# Patient Record
Sex: FEMALE | Race: WHITE | NOT HISPANIC OR LATINO | URBAN - METROPOLITAN AREA
[De-identification: names, ages, dates, MRNs, and addresses within clinical notes are randomized per-mention and may not be internally consistent; named-entity substitution may affect disease eponyms.]

---

## 2021-05-17 ENCOUNTER — OFFICE VISIT (OUTPATIENT)
Dept: URBAN - METROPOLITAN AREA CLINIC 56 | Facility: CLINIC | Age: 81
End: 2021-05-17
Payer: MEDICARE

## 2021-05-17 DIAGNOSIS — H35.3131 BILATERAL NONEXUDATIVE AGE-RELATED MACULAR DEGENERATION, EARLY DRY STAGE: Primary | ICD-10-CM

## 2021-05-17 DIAGNOSIS — H52.4 PRESBYOPIA: ICD-10-CM

## 2021-05-17 DIAGNOSIS — H26.492 OTHER SECONDARY CATARACT, LEFT EYE: ICD-10-CM

## 2021-05-17 DIAGNOSIS — H35.371 PUCKERING OF MACULA, RIGHT EYE: ICD-10-CM

## 2021-05-17 DIAGNOSIS — H26.491 OTHER SECONDARY CATARACT, RIGHT EYE: ICD-10-CM

## 2021-05-17 DIAGNOSIS — H43.812 VITREOUS DEGENERATION, LEFT EYE: ICD-10-CM

## 2021-05-17 PROCEDURE — 92134 CPTRZ OPH DX IMG PST SGM RTA: CPT | Performed by: OPTOMETRIST

## 2021-05-17 PROCEDURE — 99204 OFFICE O/P NEW MOD 45 MIN: CPT | Performed by: OPTOMETRIST

## 2021-05-17 ASSESSMENT — VISUAL ACUITY
OD: 20/40
OS: 20/50

## 2021-05-17 ASSESSMENT — KERATOMETRY
OD: 47.60
OS: 47.74

## 2021-05-17 ASSESSMENT — INTRAOCULAR PRESSURE
OD: 16
OS: 18

## 2021-05-17 NOTE — IMPRESSION/PLAN
Impression: Vitreous degeneration, left eye: H43.812. Plan:  The macular morphology in the Left eye is normal.  All signs and risks of retinal detachment and tears were discussed in detail. Patient instructed to call the office immediately if any symptoms noted.

## 2021-05-17 NOTE — IMPRESSION/PLAN
Impression: Puckering of macula, right eye: H35.371. Plan: Explained to the patient the mild wrinkling observed in her Right eye is contributing slightly to her reduced acuity but the condition does not require any treatment other than monitoring with an annual comprehensive exam and same day OCT macular scan. Educated patient on findings and diagnosis. OCT Macular Scan completed, reviewed and documented. Will continue to monitor.

## 2021-05-17 NOTE — IMPRESSION/PLAN
Impression: Other secondary cataract, right eye: H26.491. Plan: Discussed procedure. Risks, benefits, alternatives reviewed with patient. Patient elects to proceed with YAG. Refer for YAG capsulotomy consultation.

## 2021-05-17 NOTE — IMPRESSION/PLAN
Impression: Other secondary cataract, left eye: H26.492. Plan: Discussed procedure. Risks, benefits, alternatives reviewed with patient. Patient elects to proceed with YAG. Refer for YAG capsulotomy consultation.

## 2021-06-04 ENCOUNTER — ADULT PHYSICAL (OUTPATIENT)
Dept: URBAN - METROPOLITAN AREA CLINIC 56 | Facility: CLINIC | Age: 81
End: 2021-06-04
Payer: MEDICARE

## 2021-06-04 DIAGNOSIS — Z01.818 ENCOUNTER FOR OTHER PREPROCEDURAL EXAMINATION: Primary | ICD-10-CM

## 2021-06-04 DIAGNOSIS — H26.493 OTHER SECONDARY CATARACT, BILATERAL: ICD-10-CM

## 2021-06-04 PROCEDURE — 99203 OFFICE O/P NEW LOW 30 MIN: CPT | Performed by: PHYSICIAN ASSISTANT

## 2021-06-15 ENCOUNTER — SURGERY (OUTPATIENT)
Dept: URBAN - METROPOLITAN AREA SURGERY 19 | Facility: SURGERY | Age: 81
End: 2021-06-15
Payer: MEDICARE

## 2021-06-15 PROCEDURE — 66821 AFTER CATARACT LASER SURGERY: CPT | Performed by: OPHTHALMOLOGY

## 2021-06-23 ENCOUNTER — SURGERY (OUTPATIENT)
Dept: URBAN - METROPOLITAN AREA SURGERY 19 | Facility: SURGERY | Age: 81
End: 2021-06-23
Payer: MEDICARE

## 2021-06-23 PROCEDURE — 66821 AFTER CATARACT LASER SURGERY: CPT | Performed by: OPHTHALMOLOGY

## 2021-07-08 ENCOUNTER — POST-OPERATIVE VISIT (OUTPATIENT)
Dept: URBAN - METROPOLITAN AREA CLINIC 56 | Facility: CLINIC | Age: 81
End: 2021-07-08

## 2021-07-08 DIAGNOSIS — Z96.1 PRESENCE OF INTRAOCULAR LENS: Primary | ICD-10-CM

## 2021-07-08 PROCEDURE — 99024 POSTOP FOLLOW-UP VISIT: CPT | Performed by: OPTOMETRIST

## 2021-07-08 ASSESSMENT — VISUAL ACUITY
OD: 20/40++
OS: 20/40

## 2021-07-08 ASSESSMENT — INTRAOCULAR PRESSURE
OD: 15
OS: 32

## 2021-07-08 NOTE — IMPRESSION/PLAN
Impression: S/P YAG Capsulotomy (Yttrium Aluminum [place]) OD - 15 Days. Presence of intraocular lens  Z96.1. Plan: S/P Yag OU Patient reports improvement AMD/ERM explains acuities RTC 1yr CE/ OCT MAC

## 2022-07-07 ENCOUNTER — OFFICE VISIT (OUTPATIENT)
Dept: URBAN - METROPOLITAN AREA CLINIC 56 | Facility: CLINIC | Age: 82
End: 2022-07-07
Payer: MEDICARE

## 2022-07-07 DIAGNOSIS — H40.021 OPEN ANGLE WITH BORDERLINE FINDINGS, HIGH RISK, RIGHT EYE: ICD-10-CM

## 2022-07-07 DIAGNOSIS — H40.1122 PRIMARY OPEN-ANGLE GLAUCOMA, MODERATE STAGE, LEFT EYE: Primary | ICD-10-CM

## 2022-07-07 DIAGNOSIS — H43.812 VITREOUS DEGENERATION, LEFT EYE: ICD-10-CM

## 2022-07-07 DIAGNOSIS — H35.371 PUCKERING OF MACULA, RIGHT EYE: ICD-10-CM

## 2022-07-07 DIAGNOSIS — H35.3132 BILATERAL NONEXUDATIVE AGE-RELATED MACULAR DEGENERATION, INTERMEDIATE DRY STAGE: ICD-10-CM

## 2022-07-07 DIAGNOSIS — Z96.1 PRESENCE OF PSEUDOPHAKIA: ICD-10-CM

## 2022-07-07 PROCEDURE — 99214 OFFICE O/P EST MOD 30 MIN: CPT | Performed by: STUDENT IN AN ORGANIZED HEALTH CARE EDUCATION/TRAINING PROGRAM

## 2022-07-07 PROCEDURE — 92134 CPTRZ OPH DX IMG PST SGM RTA: CPT | Performed by: STUDENT IN AN ORGANIZED HEALTH CARE EDUCATION/TRAINING PROGRAM

## 2022-07-07 PROCEDURE — 92020 GONIOSCOPY: CPT | Performed by: STUDENT IN AN ORGANIZED HEALTH CARE EDUCATION/TRAINING PROGRAM

## 2022-07-07 PROCEDURE — 92133 CPTRZD OPH DX IMG PST SGM ON: CPT | Performed by: STUDENT IN AN ORGANIZED HEALTH CARE EDUCATION/TRAINING PROGRAM

## 2022-07-07 RX ORDER — LATANOPROST 50 UG/ML
0.005 % SOLUTION OPHTHALMIC
Qty: 7.5 | Refills: 2 | Status: ACTIVE
Start: 2022-07-07

## 2022-07-07 RX ORDER — DORZOLAMIDE HYDROCHLORIDE AND TIMOLOL MALEATE 20; 5 MG/ML; MG/ML
SOLUTION/ DROPS OPHTHALMIC
Qty: 30 | Refills: 3 | Status: ACTIVE
Start: 2022-07-07

## 2022-07-07 ASSESSMENT — INTRAOCULAR PRESSURE
OD: 23
OS: 48

## 2022-07-07 ASSESSMENT — VISUAL ACUITY
OS: 20/30
OD: 20/40

## 2022-07-07 ASSESSMENT — KERATOMETRY
OS: 47.51
OD: 47.18

## 2022-07-07 NOTE — IMPRESSION/PLAN
Impression: Primary open-angle glaucoma, moderate stage, left eye: H40.1122. TP: OS low 20s (50% reduction) TMAX: 23 / 48 Gonio (07/2022): CBB OU, 4+ TM pig inf OS LRNFL (07/2022): full OD, temp thinning OS Pachs: 531 / 512 Plan: very elevated IOP OS, borderline OD. RNFL with thinning OS only, full OD. Gonio open OU but heavy TM pigment OS with few TIDs. Lengthy discussion regarding findings with patient - recommend lowering IOP stat OU to prevent further progression. Poor compliance with treatment can lead to permanent vision loss. Start Latanoprost QHS OU Start Dorz-Juan BID OS only

return in 7-10 days IOP and HVF 24-2

## 2022-07-07 NOTE — IMPRESSION/PLAN
Impression: Bilateral nonexudative age-related macular degeneration, intermediate dry stage: H35.3132. Plan: no ME on mac OCT. Continue AREDS 2 PO.  Monitor with Amsler - call with vision changes

## 2022-07-07 NOTE — IMPRESSION/PLAN
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 Plan: see plan Q75.6772.

## 2022-07-14 ENCOUNTER — OFFICE VISIT (OUTPATIENT)
Dept: URBAN - METROPOLITAN AREA CLINIC 56 | Facility: CLINIC | Age: 82
End: 2022-07-14
Payer: MEDICARE

## 2022-07-14 DIAGNOSIS — H40.021 OPEN ANGLE WITH BORDERLINE FINDINGS, HIGH RISK, RIGHT EYE: ICD-10-CM

## 2022-07-14 DIAGNOSIS — H40.1122 PRIMARY OPEN-ANGLE GLAUCOMA, LEFT EYE, MODERATE STAGE: Primary | ICD-10-CM

## 2022-07-14 PROCEDURE — 92083 EXTENDED VISUAL FIELD XM: CPT | Performed by: STUDENT IN AN ORGANIZED HEALTH CARE EDUCATION/TRAINING PROGRAM

## 2022-07-14 PROCEDURE — 99213 OFFICE O/P EST LOW 20 MIN: CPT | Performed by: STUDENT IN AN ORGANIZED HEALTH CARE EDUCATION/TRAINING PROGRAM

## 2022-07-14 ASSESSMENT — INTRAOCULAR PRESSURE
OD: 15
OS: 18

## 2022-07-14 NOTE — IMPRESSION/PLAN
Impression: Primary open-angle glaucoma, moderate stage, left eye: H40.1122. TP: OS low 20s (50% reduction) TMAX: 23 / 48 Gonio (07/2022): CBB OU, 4+ TM pig inf OS LRNFL (07/2022): full OD, temp thinning OS
LHVF (07/2022): full OD, dense arc sup/inf OS Pachs: 531 / 512 Plan: excellent IOP response OS. HVF full OD, corresponding dense arcuates OS. Discussed with patient. No changes made to treatment. Strict compliance or risk of permanent vision loss. Cont Latanoprost QHS OU Cont Dorz-Juan BID OS only RTC for IOP check 3 mos

## 2022-07-14 NOTE — IMPRESSION/PLAN
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 Plan: see plan A12.2633.

## 2022-10-13 ENCOUNTER — OFFICE VISIT (OUTPATIENT)
Dept: URBAN - METROPOLITAN AREA CLINIC 56 | Facility: CLINIC | Age: 82
End: 2022-10-13
Payer: MEDICARE

## 2022-10-13 DIAGNOSIS — H40.1122 PRIMARY OPEN-ANGLE GLAUCOMA, MODERATE STAGE, LEFT EYE: Primary | ICD-10-CM

## 2022-10-13 DIAGNOSIS — H40.021 OPEN ANGLE WITH BORDERLINE FINDINGS, HIGH RISK, RIGHT EYE: ICD-10-CM

## 2022-10-13 PROCEDURE — 99214 OFFICE O/P EST MOD 30 MIN: CPT | Performed by: STUDENT IN AN ORGANIZED HEALTH CARE EDUCATION/TRAINING PROGRAM

## 2022-10-13 ASSESSMENT — INTRAOCULAR PRESSURE
OD: 16
OS: 28

## 2022-10-13 NOTE — IMPRESSION/PLAN
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 Plan: see plan G33.7739.

## 2022-10-13 NOTE — IMPRESSION/PLAN
Impression: Primary open-angle glaucoma, moderate stage, left eye: H40.1122. No timolol - difficulty breathing TP: OS low 20s (50% reduction) TMAX: 23 / 48 Gonio (07/2022): CBB OU, 4+ TM pig inf OS LRNFL (07/2022): full OD, temp thinning OS
LHVF (07/2022): full OD, dense arc sup/inf OS Pachs: 531 / 512 Plan: elevated IOP OS. Recommend SLT vs OMNI vs combo - refer to glc for consult. Continue strict compliance or risk of progression Cont Latanoprost QHS OU Cont Dorzolomide BID OS Cont brimonidine BID OS Start Rhopressa QHS OS only (sample was given) RTC for consult with Dr. Randi Triana 3-4 weeks - discussed OMNI

## 2022-11-01 ENCOUNTER — OFFICE VISIT (OUTPATIENT)
Facility: LOCATION | Age: 82
End: 2022-11-01
Payer: MEDICARE

## 2022-11-01 DIAGNOSIS — H43.813 VITREOUS DEGENERATION, BILATERAL: ICD-10-CM

## 2022-11-01 DIAGNOSIS — H40.1123 PRIMARY OPEN-ANGLE GLAUCOMA, SEVERE STAGE, LEFT EYE: Primary | ICD-10-CM

## 2022-11-01 DIAGNOSIS — H35.3132 BILATERAL NONEXUDATIVE AGE-RELATED MACULAR DEGENERATION, INTERMEDIATE DRY STAGE: ICD-10-CM

## 2022-11-01 DIAGNOSIS — H40.021 OPEN ANGLE WITH BORDERLINE FINDINGS, HIGH RISK, RIGHT EYE: ICD-10-CM

## 2022-11-01 DIAGNOSIS — Z96.1 PRESENCE OF PSEUDOPHAKIA: ICD-10-CM

## 2022-11-01 PROCEDURE — 99214 OFFICE O/P EST MOD 30 MIN: CPT | Performed by: STUDENT IN AN ORGANIZED HEALTH CARE EDUCATION/TRAINING PROGRAM

## 2022-11-01 PROCEDURE — 92020 GONIOSCOPY: CPT | Performed by: STUDENT IN AN ORGANIZED HEALTH CARE EDUCATION/TRAINING PROGRAM

## 2022-11-01 RX ORDER — PREDNISOLONE ACETATE 10 MG/ML
1 % SUSPENSION/ DROPS OPHTHALMIC
Qty: 10 | Refills: 0 | Status: ACTIVE
Start: 2022-11-01

## 2022-11-01 RX ORDER — BRIMONIDINE TARTRATE 2 MG/ML
0.2 % SOLUTION/ DROPS OPHTHALMIC
Qty: 10 | Refills: 3 | Status: ACTIVE
Start: 2022-11-01

## 2022-11-01 ASSESSMENT — INTRAOCULAR PRESSURE
OS: 24
OD: 18

## 2022-11-01 NOTE — IMPRESSION/PLAN
Impression: Primary open-angle glaucoma, severe stage, left eye: H40.1123. Plan: Pachy: 127/300 Tmax: 23/48 Target IOP: low to mid teens OU Med Allergies: avoid Juan(difficult breathing) Heart / Lung / Kidney disease/sulfa allergy: Pt. denies Gonioscopy: open to tmp 360 2+ tmp OU
OCT: (7/22) 97 / 56 diffuse thinning HVF: OD: MD -2 early sup arc/ OS: MD -15 dense S> I arc
C/D: 0.3/0.8 Better seeing eye:  OD
IOP Today: 18/24 Plan: IOP today is okay OD, but still elevated OS. Previous testing reviewed. VF shows significant vision loss OS from glaucoma. IOP is too high for amount of glaucoma present. Discussed the need for SLT vs additional medicine to try and achieve better pressure control. Selective Laser Trabeculoplasty risks, benefits, alternatives to laser discussed with patient, including inflammation, IOP spike, light sensitivity. Discussed it may take anywhere from 6wks to 3mos to see the full effect of the laser. Patient understands that SLT is not a replacement for current drop therapy. All questions answered. Patient understands and desires to proceed. See Landy Kaur to schedule SLT OS. Will need appointment 6wk for IOP check. Patient to CONTINUE glaucoma gtts. Erx'd post op drop of PF TID x5days. Drops:  Continue: Latanoprost QHS OU, Dorzolamide BID OS, Brimonidine BID OS, Rhopressa QHS OS Discussed natural history of glaucoma and that irreversible vision loss can occur without adequate IOP control. Emphasized compliance with eyedrops and other treatment described above.

## 2022-11-01 NOTE — IMPRESSION/PLAN
Impression: Bilateral nonexudative age-related macular degeneration, intermediate dry stage: H35.3132. Plan: AREDS and a diet heavier in green leafy vegetables discussed. Signs and symptoms of WET macular degeneration were discussed (wavy vision, blurred vision). Patient instructed to call or return to clinic if condition gets worse.

## 2022-11-01 NOTE — IMPRESSION/PLAN
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 Plan: see plan Y34.4181.

## 2022-11-30 ENCOUNTER — SURGERY (OUTPATIENT)
Dept: URBAN - METROPOLITAN AREA SURGERY 28 | Facility: LOCATION | Age: 82
End: 2022-11-30
Payer: MEDICARE

## 2022-11-30 PROCEDURE — 65855 TRABECULOPLASTY LASER SURG: CPT | Performed by: STUDENT IN AN ORGANIZED HEALTH CARE EDUCATION/TRAINING PROGRAM

## 2022-12-27 ENCOUNTER — OFFICE VISIT (OUTPATIENT)
Facility: LOCATION | Age: 82
End: 2022-12-27
Payer: MEDICARE

## 2022-12-27 DIAGNOSIS — H40.1123 PRIMARY OPEN-ANGLE GLAUCOMA, SEVERE STAGE, LEFT EYE: Primary | ICD-10-CM

## 2022-12-27 DIAGNOSIS — H40.021 OPEN ANGLE WITH BORDERLINE FINDINGS, HIGH RISK, RIGHT EYE: ICD-10-CM

## 2022-12-27 PROCEDURE — 99213 OFFICE O/P EST LOW 20 MIN: CPT | Performed by: STUDENT IN AN ORGANIZED HEALTH CARE EDUCATION/TRAINING PROGRAM

## 2022-12-27 ASSESSMENT — INTRAOCULAR PRESSURE
OS: 32
OD: 26

## 2022-12-27 NOTE — IMPRESSION/PLAN
Impression: Primary open-angle glaucoma, severe stage, left eye: H40.1123. Plan: Pachy: 474/712 Tmax: 23/48 Target IOP: low to mid teens OU Med Allergies: avoid Juan(difficult breathing) Heart / Lung / Kidney disease/sulfa allergy: Pt. denies Gonioscopy: open to tmp 360 2+ tmp OU
OCT: (7/22) 97 / 56 diffuse thinning HVF: OD: MD -2 early sup arc/ OS: MD -15 dense S> I arc
C/D: 0.3/0.8 Better seeing eye:  OD
IOP Today: 26, 32 Plan: IOP today is elevated OU. Patient states that she has not used any drops. Recommend to RESTART all medications. Discussed future potential surgical options but patient would like to hold off at this time. Drops: RESTART:  Latanoprost QHS OU, Dorzolamide BID OS, Brimonidine BID OS, Rhopressa QHS OS Discussed natural history of glaucoma and that irreversible vision loss can occur without adequate IOP control. Emphasized compliance with eyedrops and other treatment described above.

## 2022-12-27 NOTE — IMPRESSION/PLAN
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 Plan: see plan U24.9091.

## 2023-01-24 ENCOUNTER — OFFICE VISIT (OUTPATIENT)
Facility: LOCATION | Age: 83
End: 2023-01-24
Payer: MEDICARE

## 2023-01-24 DIAGNOSIS — H40.021 OPEN ANGLE WITH BORDERLINE FINDINGS, HIGH RISK, RIGHT EYE: ICD-10-CM

## 2023-01-24 DIAGNOSIS — H40.1123 PRIMARY OPEN-ANGLE GLAUCOMA, SEVERE STAGE, LEFT EYE: Primary | ICD-10-CM

## 2023-01-24 PROCEDURE — 99213 OFFICE O/P EST LOW 20 MIN: CPT | Performed by: STUDENT IN AN ORGANIZED HEALTH CARE EDUCATION/TRAINING PROGRAM

## 2023-01-24 ASSESSMENT — INTRAOCULAR PRESSURE
OD: 18
OS: 20

## 2023-01-24 NOTE — IMPRESSION/PLAN
Impression: Primary open-angle glaucoma, severe stage, left eye: H40.1123. Plan: Pachy: 739/352 Tmax: 23/48 Target IOP: <20 OD, low teens OS Med Allergies: avoid Juan(difficult breathing) Heart / Lung / Kidney disease/sulfa allergy: Pt. denies Gonioscopy: open to tmp 360 2+ tmp OU
OCT: (7/22) 97 / 56 diffuse thinning HVF: OD: MD -2 early sup arc/ OS: MD -15 dense S> I arc
C/D: 0.3/0.8 Better seeing eye:  OD
IOP Today: 18/20 Plan: IOP today is lower OU sine restarting gtts. IOP at goal OD, OS needs to be lower. Discussed surgical inventions to help lower IOP OS with patient. Patient wishes to wait 1 month and reevaluate. Will continue to monitor IOP. Drops: CONTINUE:  Latanoprost QHS OU, Dorzolamide BID OS, Brimonidine BID OS, Rhopressa QHS OS(sample given in clinic today) Discussed natural history of glaucoma and that irreversible vision loss can occur without adequate IOP control. Emphasized compliance with eyedrops and other treatment described above.

## 2023-01-24 NOTE — IMPRESSION/PLAN
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 Plan: see plan M90.2212.

## 2023-02-21 ENCOUNTER — OFFICE VISIT (OUTPATIENT)
Facility: LOCATION | Age: 83
End: 2023-02-21
Payer: MEDICARE

## 2023-02-21 DIAGNOSIS — H40.021 OPEN ANGLE WITH BORDERLINE FINDINGS, HIGH RISK, RIGHT EYE: ICD-10-CM

## 2023-02-21 DIAGNOSIS — H40.1123 PRIMARY OPEN-ANGLE GLAUCOMA, SEVERE STAGE, LEFT EYE: Primary | ICD-10-CM

## 2023-02-21 DIAGNOSIS — H35.371 PUCKERING OF MACULA, RIGHT EYE: ICD-10-CM

## 2023-02-21 PROCEDURE — 99214 OFFICE O/P EST MOD 30 MIN: CPT | Performed by: STUDENT IN AN ORGANIZED HEALTH CARE EDUCATION/TRAINING PROGRAM

## 2023-02-21 RX ORDER — BRIMONIDINE TARTRATE 2 MG/ML
0.2 % SOLUTION/ DROPS OPHTHALMIC
Qty: 10 | Refills: 5 | Status: ACTIVE
Start: 2023-02-21

## 2023-02-21 RX ORDER — DORZOLAMIDE HCL 20 MG/ML
2 % SOLUTION/ DROPS OPHTHALMIC
Qty: 10 | Refills: 5 | Status: ACTIVE
Start: 2023-02-21

## 2023-02-21 RX ORDER — NETARSUDIL 0.2 MG/ML
0.02 % SOLUTION/ DROPS OPHTHALMIC; TOPICAL
Qty: 2.5 | Refills: 5 | Status: ACTIVE
Start: 2023-02-21

## 2023-02-21 RX ORDER — LATANOPROST 50 UG/ML
0.005 % SOLUTION OPHTHALMIC
Qty: 7.5 | Refills: 2 | Status: ACTIVE
Start: 2023-02-21

## 2023-02-21 RX ORDER — OFLOXACIN 3 MG/ML
0.3 % SOLUTION/ DROPS OPHTHALMIC
Qty: 5 | Refills: 0 | Status: ACTIVE
Start: 2023-02-21

## 2023-02-21 RX ORDER — PREDNISOLONE ACETATE 10 MG/ML
1 % SUSPENSION/ DROPS OPHTHALMIC
Qty: 10 | Refills: 3 | Status: ACTIVE
Start: 2023-02-21

## 2023-02-21 ASSESSMENT — INTRAOCULAR PRESSURE
OS: 19
OD: 18

## 2023-02-21 NOTE — IMPRESSION/PLAN
Impression: Primary open-angle glaucoma, severe stage, left eye: H40.1123.
2 Plan: Xen gel stent is recommended. Glaucoma diagnosis and progressive vision loss from elevated IOP discussed at length. R/B/A of surgery discussed. The Patient is aware that the risks of Xen include but are not limited to loss of vision and loss of the eye, bleeding, infection, inflammation, Hypotony, wound leak, Persistent IOP elevation, intractable diplopia, bleb failure, scarring, and orbital or ocular inflammation. This surgery is an attempt to preserve vision, vision improvement is not expected. The patient is aware that failure to lower IOP will likely lead to progressive sight loss and possibly blindness. Pt. understand and wishes to proceed. * Please notify OR to order device (Xen gel stent) and Mitomycin 4% (allow 1 week for mitomycin to be ordered). No injectable/Dexycu, use drops. ERx'd Ofloxacin QID OS and Prednisolone Q2H OS as requested.

## 2023-02-21 NOTE — IMPRESSION/PLAN
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 Plan: see plan R98.0801.

## 2023-02-21 NOTE — IMPRESSION/PLAN
Impression: Puckering of macula, right eye: H35.371. Plan: ERM with retinal thickening OD present on previous OCT and today's exam. Recommend patient to get consult with retina.

## 2023-02-21 NOTE — IMPRESSION/PLAN
Impression: Primary open-angle glaucoma, severe stage, left eye: H40.1123.
1 Plan: Ocular Surgical History: IOL OU, YAG OU, SLT OS Pachy: 531/512 Tmax: 23/48 Target IOP: <20 OD, low teens OS Med Allergies: avoid Juan(difficult breathing) Heart / Lung / Kidney disease/sulfa allergy: Pt. denies Gonioscopy: open to tmp 360 2+ tmp OU
OCT: (7/22) 97 / 56 diffuse thinning HVF: OD: MD -2 early sup arc/ OS: MD -15 dense S> I arc
C/D: 0.3/0.8 Better seeing eye: OD
IOP Today: 18/19 Plan: IOP today is unchanged. Recommend Xen OS to lower IOP. Drops: STOP Latanoprost OD, CHANGE Rhopressa QHS from OS to OU, CONTINUE Latanoprost QHS OS, Dorzolamide BID OS, and Brimonidine BID OS. Discussed natural history of glaucoma and that irreversible vision loss can occur without adequate IOP control. Emphasized compliance with eyedrops and other treatment described above.

## 2023-03-24 ENCOUNTER — POST-OPERATIVE VISIT (OUTPATIENT)
Facility: LOCATION | Age: 83
End: 2023-03-24
Payer: MEDICARE

## 2023-03-24 DIAGNOSIS — Z48.810 ENCOUNTER FOR SURGICAL AFTERCARE FOLLOWING SURGERY ON A SENSE ORGAN: Primary | ICD-10-CM

## 2023-03-24 PROCEDURE — 99024 POSTOP FOLLOW-UP VISIT: CPT

## 2023-03-24 ASSESSMENT — INTRAOCULAR PRESSURE
OD: 12
OS: 6
OS: 5

## 2023-03-24 NOTE — IMPRESSION/PLAN
Impression: S/P Shunt:  Xen OS - 1 Day. Encounter for surgical aftercare following surgery on a sense organ  Z48.810. Excellent post op course   Post operative instructions reviewed - Plan: Patient presents for one day post op. Patient advised they are healing well from surgery. Patient advised to refrain from heavy lifting, dirty or getachew environments, water or sweat in the eye and to avoid bending past the hip. Patient advised to wear eye shield at night and refrain from rubbing the eye. Patient was also advised to keep appointment in one week for follow up. Patient to call office with any increased pain or decreased vision.  --Taper Prednisolone acetate 1% as directed started at 6x a day x 2 weeks--Continue Ocuflox QID

--pt to d/c all glaucoma drops OS

## 2023-03-27 ENCOUNTER — POST-OPERATIVE VISIT (OUTPATIENT)
Facility: LOCATION | Age: 83
End: 2023-03-27
Payer: MEDICARE

## 2023-03-27 DIAGNOSIS — Z48.810 ENCOUNTER FOR SURGICAL AFTERCARE FOLLOWING SURGERY ON A SENSE ORGAN: Primary | ICD-10-CM

## 2023-03-27 PROCEDURE — 99024 POSTOP FOLLOW-UP VISIT: CPT

## 2023-03-27 RX ORDER — ATROPINE SULFATE 10 MG/ML
1 % SOLUTION/ DROPS OPHTHALMIC
Qty: 5 | Refills: 0 | Status: INACTIVE
Start: 2023-03-27 | End: 2023-03-29

## 2023-03-27 ASSESSMENT — INTRAOCULAR PRESSURE
OD: 16
OS: 72

## 2023-03-27 NOTE — IMPRESSION/PLAN
Impression: S/P Shunt:  Xen OS - 4 Days. Encounter for surgical aftercare following surgery on a sense organ  Z48.810.

--IOP 72, iris touching cornea
--gave pt two tablets of diamox in office
--called pt after talking to Dr. Janice Street. Atropine sent to pharmacy, pt instructed to take 2 drops tonight and one drop tomorrow Plan: Pt educated on condition. Had  contact Dr. Aalna Staley team, Pt will be seen ASAP (7:55 tomorrow). Dr. Janice Street in sx today.

## 2023-03-28 ENCOUNTER — POST-OPERATIVE VISIT (OUTPATIENT)
Dept: URBAN - METROPOLITAN AREA CLINIC 33 | Facility: CLINIC | Age: 83
End: 2023-03-28
Payer: MEDICARE

## 2023-03-28 PROCEDURE — 99024 POSTOP FOLLOW-UP VISIT: CPT | Performed by: STUDENT IN AN ORGANIZED HEALTH CARE EDUCATION/TRAINING PROGRAM

## 2023-03-28 ASSESSMENT — INTRAOCULAR PRESSURE
OD: 15
OS: 37

## 2023-03-28 NOTE — IMPRESSION/PLAN
Impression: Encounter for surgical aftercare following surgery on a sense organ: Z48.810 OS. Plan: Ocular Surgical History: IOL OU, YAG OU, SLT OS Pachy: 531/512 Tmax: 23/48 Target IOP: <20 OD, low teens OS Med Allergies: avoid Juan(difficult breathing) Heart / Lung / Kidney disease/sulfa allergy: Pt. denies Gonioscopy: open to tmp 360 2+ tmp OU
OCT: (7/22) 97 / 56 diffuse thinning HVF: OD: MD -2 early sup arc/ OS: MD -15 dense S> I arc
C/D: 0.3/0.8 Better seeing eye: OD
IOP Today: 15/37 Plan: IOP is elevated since Xen OS. Aqueous misdirection present and confirmed with bscan today. Drops: Acetazolamide 500mg BID (today only), Atropine BID OS, Ofloxacin QID OS, Prednisolone 6x's daily OS, Rhopressa QHS OU, Dorzolamide BID OS, and Brimonidine BID OS. Drop sheet given and explained to patient. Activity restrictions in place including;  bending below the waist, lifting anything 10lbs or heavier, swimming, rubbing the eye until further notice.

## 2023-03-30 ENCOUNTER — POST-OPERATIVE VISIT (OUTPATIENT)
Facility: LOCATION | Age: 83
End: 2023-03-30
Payer: MEDICARE

## 2023-03-30 PROCEDURE — 99024 POSTOP FOLLOW-UP VISIT: CPT

## 2023-03-30 ASSESSMENT — INTRAOCULAR PRESSURE
OD: 18
OS: 15

## 2023-03-30 NOTE — IMPRESSION/PLAN
Impression: S/P PPV iridozonulectomy OS - 1 Day. Encounter for surgical aftercare following surgery on a sense organ  Z48.810. Excellent post op course   Post operative instructions reviewed - Plan: Patient presents for one day post op. Patient advised they are healing well from surgery. Patient advised to refrain from heavy lifting, dirty or getachew environments, water or sweat in the eye and to avoid bending past the hip. Patient advised to wear eye shield at night and refrain from rubbing the eye. Patient was also advised to keep Scheduled appointment with Dr. Tari Jackson. Patient to call office with any increased pain or decreased vision.  --Continue Ofloxacin 0.3% QID
--Taper Prednisolone acetate 1% 6x a day

## 2023-04-04 ENCOUNTER — POST-OPERATIVE VISIT (OUTPATIENT)
Facility: LOCATION | Age: 83
End: 2023-04-04
Payer: MEDICARE

## 2023-04-04 DIAGNOSIS — Z48.810 ENCOUNTER FOR SURGICAL AFTERCARE FOLLOWING SURGERY ON A SENSE ORGAN: Primary | ICD-10-CM

## 2023-04-04 PROCEDURE — 99024 POSTOP FOLLOW-UP VISIT: CPT | Performed by: STUDENT IN AN ORGANIZED HEALTH CARE EDUCATION/TRAINING PROGRAM

## 2023-04-04 RX ORDER — BRIMONIDINE TARTRATE 2 MG/ML
0.2 % SOLUTION/ DROPS OPHTHALMIC
Qty: 10 | Refills: 5 | Status: ACTIVE
Start: 2023-04-04

## 2023-04-04 RX ORDER — DORZOLAMIDE HCL 20 MG/ML
2 % SOLUTION/ DROPS OPHTHALMIC
Qty: 10 | Refills: 5 | Status: ACTIVE
Start: 2023-04-04

## 2023-04-04 ASSESSMENT — INTRAOCULAR PRESSURE
OS: 22
OD: 18

## 2023-04-04 NOTE — IMPRESSION/PLAN
Impression: Encounter for surgical aftercare following surgery on a sense organ: Z48.810 OS. Plan: Ocular Surgical History: IOL OU, YAG OU, SLT OS, Xen OS, PPVIT OS Pachy: 531/512 Tmax: 23/48 Target IOP: <20 OD, low teens OS Med Allergies: avoid Juan(difficult breathing) Heart / Lung / Kidney disease/sulfa allergy: Pt. denies Gonioscopy: open to tmp 360 2+ tmp OU
OCT: (7/22) 97 / 56 diffuse thinning HVF: OD: MD -2 early sup arc/ OS: MD -15 dense S> I arc
C/D: 0.3/0.8 Better seeing eye: OD
IOP Today: 18/22 Plan: IOP is better, but still above target. Eye is doing well. Activity restrictions still in place until further notice Drops: RESTART Brimonidine BID OS, and Dorzolamide BID OS, CONTINUE Rhopressa QHS OD, Ofloxacin QID OS, and Prednisolone 6x/day. Drop sheet was given and explained. Patient to use drops as directed, EVEN on mornings of appointments. Poor compliance can lead to blindness.

## 2023-04-18 ENCOUNTER — POST-OPERATIVE VISIT (OUTPATIENT)
Facility: LOCATION | Age: 83
End: 2023-04-18
Payer: MEDICARE

## 2023-04-18 DIAGNOSIS — Z48.810 ENCOUNTER FOR SURGICAL AFTERCARE FOLLOWING SURGERY ON A SENSE ORGAN: Primary | ICD-10-CM

## 2023-04-18 PROCEDURE — 99024 POSTOP FOLLOW-UP VISIT: CPT | Performed by: STUDENT IN AN ORGANIZED HEALTH CARE EDUCATION/TRAINING PROGRAM

## 2023-04-18 RX ORDER — SODIUM CHLORIDE 20 MG/ML
2 % SOLUTION OPHTHALMIC
Qty: 30 | Refills: 2 | Status: INACTIVE
Start: 2023-04-18 | End: 2023-05-01

## 2023-04-18 ASSESSMENT — INTRAOCULAR PRESSURE
OS: 17
OD: 18

## 2023-04-18 NOTE — IMPRESSION/PLAN
Impression: Encounter for surgical aftercare following surgery on a sense organ: Z48.810 OS. Plan: Ocular Surgical History: IOL OU, YAG OU, SLT OS, Xen OS, PPVIT OS Pachy: 531/512 Tmax: 23/48 Target IOP: <20 OD, low teens OS Med Allergies: avoid Juan(difficult breathing) Heart / Lung / Kidney disease/sulfa allergy: Pt. denies Gonioscopy: open to tmp 360 2+ tmp OU
OCT: (7/22) 97 / 56 diffuse thinning HVF: OD: MD -2 early sup arc/ OS: MD -15 dense S> I arc
C/D: 0.3/0.8 Better seeing eye: OD
IOP Today: 18/17 Plan: IOP is lower OS, not at goal. Suspect corneal edema causing blurry VA, will start Charlene 128 2% BID OS to help resolve. Will start steroid taper. Will continue to monitor.
- Activity restrictions still in place until further notice Drops: DECREASE Pred 6x/day OS to QID and CONTINUE Brimonidine BID OS, Dorzolamide BID OS, Rhopressa QHS OD.

## 2023-05-02 ENCOUNTER — POST-OPERATIVE VISIT (OUTPATIENT)
Facility: LOCATION | Age: 83
End: 2023-05-02
Payer: MEDICARE

## 2023-05-02 DIAGNOSIS — Z48.810 ENCOUNTER FOR SURGICAL AFTERCARE FOLLOWING SURGERY ON A SENSE ORGAN: Primary | ICD-10-CM

## 2023-05-02 PROCEDURE — 99024 POSTOP FOLLOW-UP VISIT: CPT | Performed by: STUDENT IN AN ORGANIZED HEALTH CARE EDUCATION/TRAINING PROGRAM

## 2023-05-02 ASSESSMENT — INTRAOCULAR PRESSURE
OS: 18
OD: 18

## 2023-05-02 NOTE — IMPRESSION/PLAN
Impression: Encounter for surgical aftercare following surgery on a sense organ: Z48.810 OS. Plan: Ocular Surgical History: IOL OU, YAG OU, SLT OS, Xen OS, PPVIT OS Pachy: 531/512 Tmax: 23/48 Target IOP: <20 OD, mid teens OS Med Allergies: avoid Juan(difficult breathing) Heart / Lung / Kidney disease/sulfa allergy: Pt. denies Gonioscopy: open to tmp 360 2+ tmp OU
OCT: (7/22) 97 / 56 diffuse thinning HVF: OD: MD -2 early sup arc/ OS: MD -15 dense S> I arc
C/D: .3/.8 Better seeing eye: OD
IOP Today: 18/18 Plan: IOP is stable OU. - Activity restrictions lifted Drops: CONTINUE Prednisolone Taper OS, CONTINUE Brimonidine BID OS, Dorzolamide BID OS, Rhopressa QHS OD.

## 2023-08-15 ENCOUNTER — OFFICE VISIT (OUTPATIENT)
Dept: URBAN - METROPOLITAN AREA CLINIC 33 | Facility: CLINIC | Age: 83
End: 2023-08-15
Payer: MEDICARE

## 2023-08-15 DIAGNOSIS — H40.1123 PRIMARY OPEN-ANGLE GLAUCOMA, SEVERE STAGE, LEFT EYE: Primary | ICD-10-CM

## 2023-08-15 DIAGNOSIS — H35.371 PUCKERING OF MACULA, RIGHT EYE: ICD-10-CM

## 2023-08-15 DIAGNOSIS — H43.813 VITREOUS DEGENERATION, BILATERAL: ICD-10-CM

## 2023-08-15 PROCEDURE — 99213 OFFICE O/P EST LOW 20 MIN: CPT | Performed by: STUDENT IN AN ORGANIZED HEALTH CARE EDUCATION/TRAINING PROGRAM

## 2023-08-15 PROCEDURE — 92133 CPTRZD OPH DX IMG PST SGM ON: CPT | Performed by: STUDENT IN AN ORGANIZED HEALTH CARE EDUCATION/TRAINING PROGRAM

## 2023-08-15 RX ORDER — DORZOLAMIDE HCL 20 MG/ML
2 % SOLUTION/ DROPS OPHTHALMIC
Qty: 10 | Refills: 5 | Status: ACTIVE
Start: 2023-08-15

## 2023-08-15 RX ORDER — NETARSUDIL 0.2 MG/ML
0.02 % SOLUTION/ DROPS OPHTHALMIC; TOPICAL
Qty: 2.5 | Refills: 5 | Status: ACTIVE
Start: 2023-08-15

## 2023-08-15 RX ORDER — BRIMONIDINE TARTRATE 2 MG/ML
0.2 % SOLUTION/ DROPS OPHTHALMIC
Qty: 10 | Refills: 5 | Status: ACTIVE
Start: 2023-08-15

## 2023-08-15 ASSESSMENT — INTRAOCULAR PRESSURE
OD: 17
OS: 14

## 2023-09-06 ENCOUNTER — OFFICE VISIT (OUTPATIENT)
Dept: URBAN - METROPOLITAN AREA CLINIC 10 | Facility: CLINIC | Age: 83
End: 2023-09-06
Payer: COMMERCIAL

## 2023-09-06 DIAGNOSIS — H35.371 PUCKERING OF MACULA, RIGHT EYE: Primary | ICD-10-CM

## 2023-09-06 DIAGNOSIS — H40.832: ICD-10-CM

## 2023-09-06 DIAGNOSIS — H17.12 CENTRAL CORNEAL OPACITY OF LEFT EYE: ICD-10-CM

## 2023-09-06 DIAGNOSIS — H40.1123 PRIMARY OPEN-ANGLE GLAUCOMA, SEVERE STAGE, LEFT EYE: ICD-10-CM

## 2023-09-06 PROCEDURE — 99214 OFFICE O/P EST MOD 30 MIN: CPT | Performed by: OPHTHALMOLOGY

## 2023-09-06 PROCEDURE — 92134 CPTRZ OPH DX IMG PST SGM RTA: CPT | Performed by: OPHTHALMOLOGY

## 2023-09-06 ASSESSMENT — INTRAOCULAR PRESSURE
OS: 16
OD: 14

## 2023-12-13 ENCOUNTER — OFFICE VISIT (OUTPATIENT)
Dept: URBAN - METROPOLITAN AREA CLINIC 10 | Facility: CLINIC | Age: 83
End: 2023-12-13
Payer: COMMERCIAL

## 2023-12-13 DIAGNOSIS — H40.1123 PRIMARY OPEN-ANGLE GLAUCOMA, SEVERE STAGE, LEFT EYE: ICD-10-CM

## 2023-12-13 DIAGNOSIS — H18.052 POSTERIOR CORNEAL PIGMENTATIONS, LEFT EYE: Primary | ICD-10-CM

## 2023-12-13 DIAGNOSIS — H17.12 CENTRAL CORNEAL OPACITY OF LEFT EYE: ICD-10-CM

## 2023-12-13 DIAGNOSIS — H40.832: ICD-10-CM

## 2023-12-13 DIAGNOSIS — H35.371 PUCKERING OF MACULA, RIGHT EYE: ICD-10-CM

## 2023-12-13 PROCEDURE — 92286 ANT SGM IMG I&R SPECLR MIC: CPT | Performed by: OPHTHALMOLOGY

## 2023-12-13 PROCEDURE — 92025 CPTRIZED CORNEAL TOPOGRAPHY: CPT | Performed by: OPHTHALMOLOGY

## 2023-12-13 PROCEDURE — 99214 OFFICE O/P EST MOD 30 MIN: CPT | Performed by: OPHTHALMOLOGY

## 2023-12-13 PROCEDURE — 76514 ECHO EXAM OF EYE THICKNESS: CPT | Performed by: OPHTHALMOLOGY

## 2023-12-13 ASSESSMENT — INTRAOCULAR PRESSURE
OD: 24
OS: 17

## 2023-12-13 ASSESSMENT — VISUAL ACUITY: OS: 20/80

## 2024-03-06 ENCOUNTER — OFFICE VISIT (OUTPATIENT)
Dept: URBAN - METROPOLITAN AREA CLINIC 10 | Facility: CLINIC | Age: 84
End: 2024-03-06
Payer: COMMERCIAL

## 2024-03-06 DIAGNOSIS — H35.371 PUCKERING OF MACULA, RIGHT EYE: ICD-10-CM

## 2024-03-06 DIAGNOSIS — H17.12 CENTRAL CORNEAL OPACITY OF LEFT EYE: Primary | ICD-10-CM

## 2024-03-06 PROCEDURE — 99214 OFFICE O/P EST MOD 30 MIN: CPT | Performed by: OPHTHALMOLOGY

## 2024-03-06 RX ORDER — SODIUM CHLORIDE 50 MG/G
5 % OINTMENT OPHTHALMIC
Qty: 1 | Refills: 4 | Status: ACTIVE
Start: 2024-03-06

## 2024-03-06 ASSESSMENT — INTRAOCULAR PRESSURE
OS: 19
OD: 21

## 2024-09-11 ENCOUNTER — OFFICE VISIT (OUTPATIENT)
Dept: URBAN - METROPOLITAN AREA CLINIC 10 | Facility: CLINIC | Age: 84
End: 2024-09-11
Payer: COMMERCIAL

## 2024-09-11 DIAGNOSIS — H17.12 CENTRAL CORNEAL OPACITY OF LEFT EYE: ICD-10-CM

## 2024-09-11 DIAGNOSIS — H35.371 PUCKERING OF MACULA, RIGHT EYE: Primary | ICD-10-CM

## 2024-09-11 PROCEDURE — 92134 CPTRZ OPH DX IMG PST SGM RTA: CPT | Performed by: OPHTHALMOLOGY

## 2024-09-11 PROCEDURE — 99214 OFFICE O/P EST MOD 30 MIN: CPT | Performed by: OPHTHALMOLOGY

## 2024-09-11 ASSESSMENT — INTRAOCULAR PRESSURE
OD: 15
OS: 14

## 2025-03-24 ENCOUNTER — OFFICE VISIT (OUTPATIENT)
Dept: URBAN - METROPOLITAN AREA CLINIC 10 | Facility: CLINIC | Age: 85
End: 2025-03-24
Payer: COMMERCIAL

## 2025-03-24 DIAGNOSIS — H27.132 DISLOCATED POST LENS OF LT EYE: Primary | ICD-10-CM

## 2025-03-24 DIAGNOSIS — H40.021 OPEN ANGLE WITH BORDERLINE FINDINGS, HIGH RISK, RIGHT EYE: ICD-10-CM

## 2025-03-24 DIAGNOSIS — H40.1123 PRIMARY OPEN-ANGLE GLAUCOMA, SEVERE STAGE, LEFT EYE: ICD-10-CM

## 2025-03-24 PROCEDURE — 99214 OFFICE O/P EST MOD 30 MIN: CPT | Performed by: STUDENT IN AN ORGANIZED HEALTH CARE EDUCATION/TRAINING PROGRAM

## 2025-03-24 PROCEDURE — 92083 EXTENDED VISUAL FIELD XM: CPT | Performed by: STUDENT IN AN ORGANIZED HEALTH CARE EDUCATION/TRAINING PROGRAM

## 2025-03-24 PROCEDURE — 92133 CPTRZD OPH DX IMG PST SGM ON: CPT | Performed by: STUDENT IN AN ORGANIZED HEALTH CARE EDUCATION/TRAINING PROGRAM

## 2025-03-24 ASSESSMENT — INTRAOCULAR PRESSURE
OD: 16
OS: 16

## 2025-04-16 ENCOUNTER — OFFICE VISIT (OUTPATIENT)
Dept: URBAN - METROPOLITAN AREA CLINIC 10 | Facility: CLINIC | Age: 85
End: 2025-04-16
Payer: COMMERCIAL

## 2025-04-16 DIAGNOSIS — T85.22XA DISPLACEMENT OF INTRAOCULAR LENS, INITIAL ENCOUNTER: ICD-10-CM

## 2025-04-16 DIAGNOSIS — H35.371 PUCKERING OF MACULA, RIGHT EYE: Primary | ICD-10-CM

## 2025-04-16 DIAGNOSIS — H17.12 CENTRAL CORNEAL OPACITY OF LEFT EYE: ICD-10-CM

## 2025-04-16 DIAGNOSIS — H40.1123 PRIMARY OPEN-ANGLE GLAUCOMA, SEVERE STAGE, LEFT EYE: ICD-10-CM

## 2025-04-16 PROCEDURE — 99214 OFFICE O/P EST MOD 30 MIN: CPT | Performed by: OPHTHALMOLOGY

## 2025-04-16 ASSESSMENT — INTRAOCULAR PRESSURE
OS: 16
OD: 17